# Patient Record
Sex: MALE | Race: WHITE | NOT HISPANIC OR LATINO | Employment: FULL TIME | URBAN - METROPOLITAN AREA
[De-identification: names, ages, dates, MRNs, and addresses within clinical notes are randomized per-mention and may not be internally consistent; named-entity substitution may affect disease eponyms.]

---

## 2020-10-02 ENCOUNTER — LAB (OUTPATIENT)
Dept: LAB | Facility: HOSPITAL | Age: 20
End: 2020-10-02

## 2020-10-02 ENCOUNTER — TRANSCRIBE ORDERS (OUTPATIENT)
Dept: LAB | Facility: HOSPITAL | Age: 20
End: 2020-10-02

## 2020-10-02 DIAGNOSIS — K60.3 ANAL FISTULA: Primary | ICD-10-CM

## 2020-10-02 PROCEDURE — 93005 ELECTROCARDIOGRAM TRACING: CPT | Performed by: COLON & RECTAL SURGERY

## 2020-10-02 PROCEDURE — 93010 ELECTROCARDIOGRAM REPORT: CPT | Performed by: INTERNAL MEDICINE

## 2021-02-02 ENCOUNTER — TELEPHONE (OUTPATIENT)
Dept: NEUROLOGY | Facility: CLINIC | Age: 21
End: 2021-02-02

## 2021-02-02 NOTE — TELEPHONE ENCOUNTER
Caller: PT    Relationship to patient: SELF    Best call back number: [phone]    Chief complaint: NA    Type of visit: NEW PATIENT    Requested date: NA     If rescheduling, when is the original appointment: PATIENT CANCELLED APPT     Additional notes: PATIENT CALLED AND STATED HE COULD ONLY DO AN APPT AFTER 3:30 PM DUE TO HIS WORK SCHEDULE. I DID NOT FIND ANYTHING IN THAT TIME FRAME GOING OUT INTO SUMMER FOR DR. CARDOSO. PLEASE ADVISE.

## 2021-05-14 ENCOUNTER — APPOINTMENT (OUTPATIENT)
Dept: GENERAL RADIOLOGY | Facility: HOSPITAL | Age: 21
End: 2021-05-14

## 2021-05-14 ENCOUNTER — HOSPITAL ENCOUNTER (EMERGENCY)
Facility: HOSPITAL | Age: 21
Discharge: HOME | End: 2021-05-14
Attending: EMERGENCY MEDICINE | Admitting: EMERGENCY MEDICINE

## 2021-05-14 VITALS
RESPIRATION RATE: 18 BRPM | BODY MASS INDEX: 26.41 KG/M2 | OXYGEN SATURATION: 100 % | TEMPERATURE: 97.8 F | WEIGHT: 195 LBS | HEIGHT: 72 IN | SYSTOLIC BLOOD PRESSURE: 142 MMHG | HEART RATE: 66 BPM | DIASTOLIC BLOOD PRESSURE: 87 MMHG
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
## 2021-05-14 DIAGNOSIS — S16.1XXA STRAIN OF NECK MUSCLE, INITIAL ENCOUNTER: Primary | ICD-10-CM

## 2021-05-14 DIAGNOSIS — S50.812A FOREARM ABRASION, LEFT, INITIAL ENCOUNTER: ICD-10-CM

## 2021-05-14 DIAGNOSIS — M54.9 MUSCULOSKELETAL BACK PAIN: ICD-10-CM

## 2021-05-14 DIAGNOSIS — S80.12XA CONTUSION OF LEFT LOWER LEG, INITIAL ENCOUNTER: ICD-10-CM

## 2021-05-14 PROCEDURE — 25010000002 TDAP 5-2.5-18.5 LF-MCG/0.5 SUSPENSION: Performed by: NURSE PRACTITIONER

## 2021-05-14 PROCEDURE — 90471 IMMUNIZATION ADMIN: CPT | Performed by: NURSE PRACTITIONER

## 2021-05-14 PROCEDURE — 99283 EMERGENCY DEPT VISIT LOW MDM: CPT

## 2021-05-14 PROCEDURE — 72072 X-RAY EXAM THORAC SPINE 3VWS: CPT

## 2021-05-14 PROCEDURE — 90715 TDAP VACCINE 7 YRS/> IM: CPT | Performed by: NURSE PRACTITIONER

## 2021-05-14 PROCEDURE — 72100 X-RAY EXAM L-S SPINE 2/3 VWS: CPT

## 2021-05-14 PROCEDURE — 73590 X-RAY EXAM OF LOWER LEG: CPT

## 2021-05-14 PROCEDURE — 72040 X-RAY EXAM NECK SPINE 2-3 VW: CPT

## 2021-05-14 RX ORDER — METHOCARBAMOL 750 MG/1
750 TABLET, FILM COATED ORAL 3 TIMES DAILY PRN
Qty: 20 TABLET | Refills: 0 | Status: SHIPPED | OUTPATIENT
Start: 2021-05-14 | End: 2021-07-13

## 2021-05-14 RX ORDER — LITHIUM CARBONATE 300 MG/1
300 CAPSULE ORAL
COMMUNITY
End: 2021-07-13

## 2021-05-14 RX ORDER — QUETIAPINE FUMARATE 100 MG/1
100 TABLET, FILM COATED ORAL NIGHTLY
COMMUNITY
End: 2021-07-13

## 2021-05-14 RX ORDER — IBUPROFEN 600 MG/1
600 TABLET ORAL ONCE
Status: COMPLETED | OUTPATIENT
Start: 2021-05-14 | End: 2021-05-14

## 2021-05-14 RX ORDER — METHYLPREDNISOLONE 4 MG/1
TABLET ORAL
Qty: 21 TABLET | Refills: 0 | Status: SHIPPED | OUTPATIENT
Start: 2021-05-14 | End: 2021-07-13

## 2021-05-14 RX ORDER — OXCARBAZEPINE 300 MG/1
TABLET ORAL
COMMUNITY
End: 2021-07-13

## 2021-05-14 RX ORDER — CYCLOBENZAPRINE HCL 10 MG
10 TABLET ORAL ONCE
Status: COMPLETED | OUTPATIENT
Start: 2021-05-14 | End: 2021-05-14

## 2021-05-14 RX ADMIN — IBUPROFEN 600 MG: 600 TABLET ORAL at 11:51

## 2021-05-14 RX ADMIN — CYCLOBENZAPRINE HYDROCHLORIDE 10 MG: 10 TABLET, FILM COATED ORAL at 11:51

## 2021-05-14 RX ADMIN — TETANUS TOXOID, REDUCED DIPHTHERIA TOXOID AND ACELLULAR PERTUSSIS VACCINE, ADSORBED 0.5 ML: 5; 2.5; 8; 8; 2.5 SUSPENSION INTRAMUSCULAR at 11:52

## 2021-06-30 ENCOUNTER — HOSPITAL ENCOUNTER (OUTPATIENT)
Dept: GENERAL RADIOLOGY | Facility: HOSPITAL | Age: 21
Discharge: HOME | End: 2021-06-30
Admitting: RADIOLOGY

## 2021-06-30 ENCOUNTER — TRANSCRIBE ORDERS (OUTPATIENT)
Dept: GENERAL RADIOLOGY | Facility: HOSPITAL | Age: 21
End: 2021-06-30

## 2021-06-30 DIAGNOSIS — H44.609: ICD-10-CM

## 2021-06-30 DIAGNOSIS — H44.609: Primary | ICD-10-CM

## 2021-06-30 PROCEDURE — 70150 X-RAY EXAM OF FACIAL BONES: CPT

## 2021-07-13 ENCOUNTER — OFFICE VISIT (OUTPATIENT)
Dept: NEUROLOGY | Facility: CLINIC | Age: 21
End: 2021-07-13

## 2021-07-13 VITALS — WEIGHT: 180 LBS | HEART RATE: 81 BPM | BODY MASS INDEX: 24.38 KG/M2 | OXYGEN SATURATION: 99 % | HEIGHT: 72 IN

## 2021-07-13 DIAGNOSIS — R56.9 SEIZURE (HCC): Primary | ICD-10-CM

## 2021-07-13 PROCEDURE — 99204 OFFICE O/P NEW MOD 45 MIN: CPT | Performed by: PSYCHIATRY & NEUROLOGY

## 2021-07-13 RX ORDER — MULTIPLE VITAMINS W/ MINERALS TAB 9MG-400MCG
1 TAB ORAL DAILY
COMMUNITY
End: 2021-10-12

## 2021-07-13 RX ORDER — TRAZODONE HYDROCHLORIDE 50 MG/1
50 TABLET ORAL NIGHTLY
Qty: 30 TABLET | Refills: 3 | OUTPATIENT
Start: 2021-07-13 | End: 2021-09-30

## 2021-07-13 NOTE — PROGRESS NOTES
Subjective:    CC: Marcell Hartman is seen today in consultation for Seizures.     HPI:  Patient is a 21-year-old male with ?  Diagnosis of complex partial seizures referred to the clinic to establish care.  He moved to [place] from [place] about a year ago.  He reports that he had first episode of questionable seizure when he was 16 years old.  At that time, he had abnormal sensory feeling starting from his left leg going up to the left arm and then to the face and then his body stiffened up.  The entire episode lasted for 1 to 2 minutes and then soon after the event was over, he was back to his baseline.  Following this, he was seen by neurology in [place], [place] and underwent MRI and EEG.  I reviewed the results of EEG that was performed back in 2019 and it did not reveal any abnormalities.  I do not have MRI brain results for me to review personally but reports that he was told that there were no abnormalities found.  There is no family history of seizures and he achieved all his milestones on time.  Following the first episode, he was started on Oxtellar  mg daily later, Lamictal was added for mood disorder.  He reports that he did stop Oxtellar XR in between for some time without any recurrent seizure.  He moved to [place] 1 year ago and was on Oxtellar for about 3 to 4 months which, he ran out of the medication.  He has now remained off of Oxtellar or lamotrigine for more than 6 to 7 months without any recurrent seizure.  He reports that he was on lithium started by psychiatrist for mood disorder but he has stopped taking it as well.  He reports that his mood is much better since coming off of Lamictal, lithium and Oxtellar XR and reports that the med issues that he was experiences was situational while he was in [place].  He does report poor rate poor quality sleep.      The following portions of the patient's history were reviewed today and updated as of 07/13/2021  : allergies, social  "history and problem list.  This document will be scanned to patient's chart.      Current Outpatient Medications:   •  multivitamin with minerals tablet tablet, Take 1 tablet by mouth Daily., Disp: , Rfl:   •  traZODone (DESYREL) 50 MG tablet, Take 1 tablet by mouth Every Night for 30 days., Disp: 30 tablet, Rfl: 3   Past Medical History:   Diagnosis Date   • Seizures (CMS/HCC)       Past Surgical History:   Procedure Laterality Date   • ANAL FISTULOTOMY        History reviewed. No pertinent family history.   Review of Systems   Constitutional: Negative.    HENT: Negative.    Eyes: Negative.    Respiratory: Negative.    Cardiovascular: Negative.    Gastrointestinal: Negative.    Endocrine: Negative.    Genitourinary: Negative.    Musculoskeletal: Negative.    Skin: Negative.    Allergic/Immunologic: Negative.    Neurological: Negative.    Hematological: Negative.    Psychiatric/Behavioral: Negative.        All other systems reviewed and are negative     Objective:    Pulse 81   Ht 182.9 cm (72\")   Wt 81.6 kg (180 lb)   SpO2 99%   BMI 24.41 kg/m²     Neurology Exam:    General apperance: NAD.     Mental status: Alert, awake and oriented to time place and person.    Recent and Remote memory: Can recall 3/3 objects at 5 minutes. Can recall historical events.     Attention span and Concentration: Serial 7s: Normal.     Fund of knowledge:  Normal.     Language and Speech: No aphasia or dysarthria.    Naming , Repitition and Comprehension:  Can name objects, repeat a sentence and follow commands. Speech is clear and fluent with good repetition, comprehension, and naming.    Cranial Nerves:   CN II: Visual fields are full. Intact. Fundi - Normal, No papillederma, Pupils - ADI  CN III, IV and VI: Extraocular movements are intact. Normal saccades.   CN V: Facial sensation is intact.   CN VII: Muscles of facial expression reveal no asymmetry. Intact.   CN VIII: Hearing is intact. Whispered voice intact.   CN IX and X: " Palate elevates symmetrically. Intact  CN XI: Shoulder shrug is intact.   CN XII: Tongue is midline without evidence of atrophy or fasciculation.     Motor:  Right UE muscle strength 5/5. Normal tone.     Left UE muscle strength 5/5. Normal tone.      Right LE muscle strength5/5. Normal tone.     Left LE muscle strength 5/5. Normal tone.      Sensory: Normal light touch, vibration and pinprick sensation bilaterally.    DTRs: 2+ bilaterally in upper and lower extremities.    Babinski: Negative bilaterally.    Co-ordination: Normal finger-to-nose, heel to shin B/L.    Rhomberg: Negative.    Gait: Normal.    Cardiovascular: Regular rate and rhythm without murmur, gallop or rub.    Ophthalmoscopic exam: Normal fundi, no papilledema.    Assessment and Plan:  1. Seizure (CMS/HCC)  ?  Complex partial seizures.  He had his first episode back in year 2016.  I reviewed the results of EEG that was last performed back in 2019 and it was normal.  MRI did not show any abnormality as per the patient.  He has now remained off of any antiseizure medication for about 8 months without any recurrent seizure.  I would like to continue to monitor him off of medication and see how he does.  Have advised him to call office right away in case he has any breakthrough seizures.  Since he is reporting difficulty with sleep, I am going to prescribe him trazodone 50 mg to be taken at bedtime.  Otherwise, I will plan to see him back in 3 months for follow-up.       Return in about 3 months (around 10/13/2021).     Adan Jacob MD

## 2021-07-30 ENCOUNTER — TELEPHONE (OUTPATIENT)
Dept: NEUROLOGY | Facility: CLINIC | Age: 21
End: 2021-07-30

## 2021-07-30 NOTE — TELEPHONE ENCOUNTER
Provider: JOSELINE  Caller: PATIENT  Relationship to Patient: SELF  Pharmacy: [facility]  Phone Number: [phone]    Reason for Call: PT WANTED TO INFORM DR TREVIZO OF A LIGHT AURA HE LAD LAST Friday- INCASE DR TREVIZO WANTED TO REDO TESTING OR PRESCRIBE ANY MEDS FOR EPILEPSY, WHICH DR TREVIZO IS ALREADY AWARE OF PTS DX.     When was the patient last seen: 7/13/21    When did it start: ONLY AURA ON Friday, FIRST ONE IN YEARS.     Characteristics of symptom/severity: PT STATES JUST WAS A LIGHT AURA, NO TENSING UP OR ANYTHING  Timing- Is it constant or intermittent: JUST A ONE TIME, SO FAR.     PT WAS WALKING DOWN STAIRS WHEN IT HAPPENED.

## 2021-09-30 PROCEDURE — U0004 COV-19 TEST NON-CDC HGH THRU: HCPCS | Performed by: FAMILY MEDICINE

## 2021-10-01 ENCOUNTER — TELEPHONE (OUTPATIENT)
Dept: URGENT CARE | Facility: CLINIC | Age: 21
End: 2021-10-01

## 2021-10-01 NOTE — TELEPHONE ENCOUNTER
----- Message from Maurizio Ness MD sent at 10/1/2021  2:44 PM EDT -----  Please inform patient of negative lab result    ----- Message -----  From: Lab, Background User  Sent: 10/1/2021   2:26 PM EDT  To:  [facility] [address] Covid Results

## 2021-10-12 ENCOUNTER — OFFICE VISIT (OUTPATIENT)
Dept: NEUROLOGY | Facility: CLINIC | Age: 21
End: 2021-10-12

## 2021-10-12 VITALS — HEIGHT: 72 IN | WEIGHT: 175 LBS | HEART RATE: 58 BPM | OXYGEN SATURATION: 97 % | BODY MASS INDEX: 23.7 KG/M2

## 2021-10-12 DIAGNOSIS — R56.9 SEIZURE (HCC): Primary | ICD-10-CM

## 2021-10-12 DIAGNOSIS — F31.81 BIPOLAR 2 DISORDER (HCC): ICD-10-CM

## 2021-10-12 PROCEDURE — 99214 OFFICE O/P EST MOD 30 MIN: CPT | Performed by: PSYCHIATRY & NEUROLOGY

## 2021-10-12 RX ORDER — QUETIAPINE FUMARATE 100 MG/1
100 TABLET, FILM COATED ORAL NIGHTLY
Qty: 30 TABLET | Refills: 3 | Status: SHIPPED | OUTPATIENT
Start: 2021-10-12 | End: 2021-11-12 | Stop reason: SDUPTHER

## 2021-10-12 RX ORDER — LITHIUM CARBONATE 300 MG/1
300 CAPSULE ORAL DAILY
Qty: 30 CAPSULE | Refills: 3 | Status: SHIPPED | OUTPATIENT
Start: 2021-10-12 | End: 2021-11-12 | Stop reason: SDUPTHER

## 2021-10-12 NOTE — PROGRESS NOTES
Subjective:    CC: Marcell Hartman is in clinic today for follow up for history of complex partial seizures.    HPI:  Initial visit: 7/13/2021: Patient is a 21-year-old male with ?  Diagnosis of complex partial seizures referred to the clinic to establish care.  He moved to [place] from [place] about a year ago.  He reports that he had first episode of questionable seizure when he was 16 years old.  At that time, he had abnormal sensory feeling starting from his left leg going up to the left arm and then to the face and then his body stiffened up.  The entire episode lasted for 1 to 2 minutes and then soon after the event was over, he was back to his baseline.  Following this, he was seen by neurology in [place], [place] and underwent MRI and EEG.  I reviewed the results of EEG that was performed back in 2019 and it did not reveal any abnormalities.  I do not have MRI brain results for me to review personally but reports that he was told that there were no abnormalities found.  There is no family history of seizures and he achieved all his milestones on time.  Following the first episode, he was started on Oxtellar  mg daily later, Lamictal was added for mood disorder.  He reports that he did stop Oxtellar XR in between for some time without any recurrent seizure.  He moved to [place] 1 year ago and was on Oxtellar for about 3 to 4 months which, he ran out of the medication.  He has now remained off of Oxtellar or lamotrigine for more than 6 to 7 months without any recurrent seizure.  He reports that he was on lithium started by psychiatrist for mood disorder but he has stopped taking it as well.  He reports that his mood is much better since coming off of Lamictal, lithium and Oxtellar XR and reports that the med issues that he was experiences was situational while he was in [place].  He does report poor rate poor quality sleep.    Follow-up: 10/12/2021: He is in clinic for regular follow-up.   "Since his last visit in July, he reports that he probably had 1 brief or where he felt somewhat confused lasting for few minutes and then he was back to his baseline.  He reports that he has not had any christine seizures.  He remains off of antiseizure medication.  He also is requesting if he can restart lithium 300 mg daily along with Seroquel 100 mg daily for his bipolar disorder.  He reports that he used to get prescription filled through her psychiatrist in [place] but now due to insurance change, he is unable to get refills through ED visits.  He is also requesting psychiatry referral here so he can be established for the better management of bipolar disorder.    The following portions of the patient's history were reviewed and updated as of 10/12/2021: allergies, social history and problem list.       Current Outpatient Medications:   •  lithium carbonate 300 MG capsule, Take 1 capsule by mouth Daily., Disp: 30 capsule, Rfl: 3  •  QUEtiapine (SEROquel) 100 MG tablet, Take 1 tablet by mouth Every Night., Disp: 30 tablet, Rfl: 3   Past Medical History:   Diagnosis Date   • Seizures (HCC)       Past Surgical History:   Procedure Laterality Date   • ANAL FISTULOTOMY        History reviewed. No pertinent family history.     Review of Systems   Constitutional: Negative.    HENT: Negative.    Eyes: Negative.    Respiratory: Negative.    Cardiovascular: Negative.    Gastrointestinal: Negative.    Endocrine: Negative.    Genitourinary: Negative.    Musculoskeletal: Negative.    Skin: Negative.    Allergic/Immunologic: Negative.    Neurological: Positive for seizures.   Hematological: Negative.    Psychiatric/Behavioral: Negative.      Objective:    Pulse 58   Ht 182.9 cm (72\")   Wt 79.4 kg (175 lb)   SpO2 97%   BMI 23.73 kg/m²     Neurology Exam:  General apperance: NAD.     Mental status: Alert, awake and oriented to time place and person.    Recent and Remote memory: Can recall 3/3 objects at 5 minutes. Can " recall historical events.     Attention span and Concentration: Serial 7s: Normal.     Fund of knowledge:  Normal.     Language and Speech: No aphasia or dysarthria.    Naming , Repitition and Comprehension:  Can name objects, repeat a sentence and follow commands. Speech is clear and fluent with good repetition, comprehension, and naming.    CN II to XII: Intact.    Opthalmoscopic Exam: No papilledema.    Motor:  Right UE muscle strength 5/5. Normal tone.     Left UE muscle strength 5/5. Normal tone.      Right LE muscle strength5/5. Normal tone.     Left LE muscle strength 5/5. Normal tone.      Sensory: Normal light touch, vibration and pinprick sensation bilaterally.    DTRs: 2+ bilaterally.    Babinski: Negative bilaterally.    Co-ordination: Normal finger-to-nose, heel to shin B/L.    Rhomberg: Negative.    Gait: Normal.    Cardiovascular: Regular rate and rhythm without murmur, gallop or rub.    Assessment and Plan:  1. Seizure (HCC)  2. Bipolar 2 disorder (HCC)  -No breakthrough seizures.  He remains off of antiseizure medication.  It has been almost over a year now without having any breakthrough seizures.  I will be prescribing him a lithium 300 mg Seroquel 100 mg to be taken once a day as he is currently completely out of medication and will be sending referral to psychiatry so that he can establish care for better management of bipolar disorder.  I will plan to see him back in clinic in 6 months for follow-up.  - Ambulatory Referral to Psychiatry       I spent 30 minutes face to face with the patient and spent more than 50% of this time  in management, instructions and education regarding above mentioned diagnosis and also on counseling and discussing about taking medication regularly, possible side effects with medication use, importance of good sleep hygiene, good hydration and regular exercise.    Return in about 6 months (around 4/12/2022).

## 2021-11-12 ENCOUNTER — TELEPHONE (OUTPATIENT)
Dept: NEUROLOGY | Facility: CLINIC | Age: 21
End: 2021-11-12

## 2021-11-12 RX ORDER — QUETIAPINE FUMARATE 100 MG/1
100 TABLET, FILM COATED ORAL NIGHTLY
Qty: 30 TABLET | Refills: 3 | Status: SHIPPED | OUTPATIENT
Start: 2021-11-12 | End: 2022-01-11

## 2021-11-12 RX ORDER — LITHIUM CARBONATE 300 MG/1
300 CAPSULE ORAL DAILY
Qty: 30 CAPSULE | Refills: 3 | Status: SHIPPED | OUTPATIENT
Start: 2021-11-12 | End: 2022-01-11

## 2021-11-12 NOTE — TELEPHONE ENCOUNTER
Caller: BEVERLY    Relationship: SELF    Best call back number: [phone]    What medications are you currently taking:   Current Outpatient Medications on File Prior to Visit   Medication Sig Dispense Refill   • lithium carbonate 300 MG capsule Take 1 capsule by mouth Daily. 30 capsule 3   • QUEtiapine (SEROquel) 100 MG tablet Take 1 tablet by mouth Every Night. 30 tablet 3   • [DISCONTINUED] traZODone (DESYREL) 50 MG tablet Take 1 tablet by mouth Every Night for 30 days. 30 tablet 3     No current facility-administered medications on file prior to visit.        Which medication are you concerned about: PER PT'S INSURANCE THIS MEDICATION NEEDS TO BE SENT TO [facility] TO BE COVERED NOT [facility]. PT NEEDS QUEtiapine (SEROquel) 100 MG tablet AND lithium carbonate 300 MG capsule    Pharmacy where request should be sent:  [id number]    Additional details provided by patient: PT CURRENTLY HAS REFILLS AT [facility] BUT INSURANCE WILL NOT COVER IF FILLED BY [facility],    HE CURRENTLY HAS 2 DAYS WORTH OF MEDICATION LEFT.     Does the patient have less than a 3 day supply:  [x] Yes  [] No    Christian Ghosh Rep   11/12/21 13:21 EST

## 2022-01-11 RX ORDER — QUETIAPINE FUMARATE 100 MG/1
TABLET, FILM COATED ORAL
Qty: 30 TABLET | Refills: 3 | Status: SHIPPED | OUTPATIENT
Start: 2022-01-11 | End: 2022-04-13

## 2022-01-11 RX ORDER — LITHIUM CARBONATE 300 MG/1
CAPSULE ORAL
Qty: 30 CAPSULE | Refills: 3 | OUTPATIENT
Start: 2022-01-11 | End: 2022-11-30

## 2022-04-13 RX ORDER — QUETIAPINE FUMARATE 100 MG/1
TABLET, FILM COATED ORAL
Qty: 90 TABLET | Refills: 1 | Status: SHIPPED | OUTPATIENT
Start: 2022-04-13 | End: 2022-12-09

## 2022-04-13 NOTE — TELEPHONE ENCOUNTER
Rx Refill Note  Requested Prescriptions     Pending Prescriptions Disp Refills   • QUEtiapine (SEROquel) 100 MG tablet [Pharmacy Med Name: QUETIAPINE FUMARATE 100 MG TAB] 90 tablet 1     Sig: TAKE 1 TABLET BY MOUTH EVERY DAY AT NIGHT      Last office visit with prescribing clinician: 10/12/2021      Next office visit with prescribing clinician: Visit date not found            Kedar Ceballos MA  04/13/22, 14:59 EDT  
none

## 2022-10-08 PROCEDURE — U0004 COV-19 TEST NON-CDC HGH THRU: HCPCS | Performed by: FAMILY MEDICINE

## 2022-10-10 ENCOUNTER — TELEPHONE (OUTPATIENT)
Dept: URGENT CARE | Facility: CLINIC | Age: 22
End: 2022-10-10

## 2022-10-10 NOTE — TELEPHONE ENCOUNTER
----- Message from Maurizio Ness MD sent at 10/10/2022  8:41 AM EDT -----  Please inform the patient's of negative COVID test    ----- Message -----  From: Lab, Background User  Sent: 10/9/2022   5:02 PM EDT  To:  [address] Covid Results    Patient called for covid result. Patient advised of result. Patient advised of understanding.

## 2022-12-09 RX ORDER — QUETIAPINE FUMARATE 100 MG/1
TABLET, FILM COATED ORAL
Qty: 90 TABLET | Refills: 1 | Status: SHIPPED | OUTPATIENT
Start: 2022-12-09

## 2022-12-09 NOTE — TELEPHONE ENCOUNTER
Rx Refill Note  Requested Prescriptions     Pending Prescriptions Disp Refills   • QUEtiapine (SEROquel) 100 MG tablet [Pharmacy Med Name: QUETIAPINE FUMARATE 100 MG TAB] 90 tablet 1     Sig: TAKE 1 TABLET BY MOUTH EVERY DAY AT NIGHT      Last filled:04/13/22 with 1 refill. Sent  Last office visit with prescribing clinician: 10/12/2021      Next office visit with prescribing clinician: Visit date not found     Reji Souza MA  12/09/22, 10:26 EST

## 2023-10-12 NOTE — TELEPHONE ENCOUNTER
"Relay     \"Outgoing call to Marcell, JERSONM    Needs to schedule follow up for refills. Also calling to see if he is still taking Seroquel, we haven't sent in refills in some time.     Asked he call back to let us know and to schedule appt.     Kristian DOVE \"                "

## 2023-10-13 RX ORDER — QUETIAPINE FUMARATE 100 MG/1
TABLET, FILM COATED ORAL
Qty: 90 TABLET | Refills: 1 | Status: SHIPPED | OUTPATIENT
Start: 2023-10-13

## 2023-10-13 NOTE — TELEPHONE ENCOUNTER
Name: Marcell Hartman  Relationship: Self  Best Callback Number: [phone]    HUB PROVIDED THE RELAY MESSAGE FROM THE OFFICE   PATIENT SCHEDULED AS REQUESTED  ADDITIONAL INFORMATION: PATIENT IS STILL TAKING SEROQUEL AND HAS ABOUT A WEEK'S WORTH LEFT. WE GOT HIM SCHEDULED FOR 1ST AVAILABLE.

## 2023-10-13 NOTE — TELEPHONE ENCOUNTER
Rx Refill Note  Requested Prescriptions     Pending Prescriptions Disp Refills    QUEtiapine (SEROquel) 100 MG tablet [Pharmacy Med Name: QUETIAPINE FUMARATE 100 MG TAB] 90 tablet 1     Sig: TAKE 1 TABLET BY MOUTH EVERY DAY AT NIGHT      Last filled: 12/9/22 90 days with 1 refill. Confirmed pt is still taking and has about a week of meds left. Scheduled next available appt. Sending enough meds to get him to then    Last office visit with prescribing clinician: 10/12/2021      Next office visit with prescribing clinician: 3/18/2024     Kristian Guzman MA  10/13/23, 13:20 EDT

## 2023-12-27 ENCOUNTER — OFFICE VISIT (OUTPATIENT)
Dept: FAMILY MEDICINE CLINIC | Facility: CLINIC | Age: 23
End: 2023-12-27
Payer: COMMERCIAL

## 2023-12-27 ENCOUNTER — LAB (OUTPATIENT)
Dept: LAB | Facility: HOSPITAL | Age: 23
End: 2023-12-27
Payer: COMMERCIAL

## 2023-12-27 VITALS
DIASTOLIC BLOOD PRESSURE: 80 MMHG | WEIGHT: 190 LBS | HEART RATE: 65 BPM | BODY MASS INDEX: 25.73 KG/M2 | OXYGEN SATURATION: 99 % | SYSTOLIC BLOOD PRESSURE: 118 MMHG | HEIGHT: 72 IN

## 2023-12-27 DIAGNOSIS — R74.01 TRANSAMINITIS: ICD-10-CM

## 2023-12-27 DIAGNOSIS — Z00.00 ANNUAL PHYSICAL EXAM: Primary | ICD-10-CM

## 2023-12-27 DIAGNOSIS — Z79.890 LONG-TERM CURRENT USE OF TESTOSTERONE REPLACEMENT THERAPY: ICD-10-CM

## 2023-12-27 DIAGNOSIS — Z11.3 ROUTINE SCREENING FOR STI (SEXUALLY TRANSMITTED INFECTION): ICD-10-CM

## 2023-12-27 LAB
ALBUMIN SERPL-MCNC: 4.2 G/DL (ref 3.5–5.2)
ALBUMIN/GLOB SERPL: 1.4 G/DL
ALP SERPL-CCNC: 54 U/L (ref 39–117)
ALT SERPL W P-5'-P-CCNC: 58 U/L (ref 1–41)
ANION GAP SERPL CALCULATED.3IONS-SCNC: 9.4 MMOL/L (ref 5–15)
AST SERPL-CCNC: 46 U/L (ref 1–40)
BILIRUB SERPL-MCNC: 0.3 MG/DL (ref 0–1.2)
BUN SERPL-MCNC: 18 MG/DL (ref 6–20)
BUN/CREAT SERPL: 16.8 (ref 7–25)
CALCIUM SPEC-SCNC: 9.7 MG/DL (ref 8.6–10.5)
CHLORIDE SERPL-SCNC: 104 MMOL/L (ref 98–107)
CO2 SERPL-SCNC: 25.6 MMOL/L (ref 22–29)
CREAT SERPL-MCNC: 1.07 MG/DL (ref 0.76–1.27)
EGFRCR SERPLBLD CKD-EPI 2021: 98.8 ML/MIN/1.73
GLOBULIN UR ELPH-MCNC: 2.9 GM/DL
GLUCOSE SERPL-MCNC: 79 MG/DL (ref 65–99)
POTASSIUM SERPL-SCNC: 4.1 MMOL/L (ref 3.5–5.2)
PROT SERPL-MCNC: 7.1 G/DL (ref 6–8.5)
SODIUM SERPL-SCNC: 139 MMOL/L (ref 136–145)
TESTOST SERPL-MCNC: 395 NG/DL (ref 249–836)

## 2023-12-27 PROCEDURE — 87591 N.GONORRHOEAE DNA AMP PROB: CPT | Performed by: STUDENT IN AN ORGANIZED HEALTH CARE EDUCATION/TRAINING PROGRAM

## 2023-12-27 PROCEDURE — 84403 ASSAY OF TOTAL TESTOSTERONE: CPT | Performed by: STUDENT IN AN ORGANIZED HEALTH CARE EDUCATION/TRAINING PROGRAM

## 2023-12-27 PROCEDURE — 80053 COMPREHEN METABOLIC PANEL: CPT | Performed by: STUDENT IN AN ORGANIZED HEALTH CARE EDUCATION/TRAINING PROGRAM

## 2023-12-27 PROCEDURE — 87661 TRICHOMONAS VAGINALIS AMPLIF: CPT | Performed by: STUDENT IN AN ORGANIZED HEALTH CARE EDUCATION/TRAINING PROGRAM

## 2023-12-27 PROCEDURE — 87491 CHLMYD TRACH DNA AMP PROBE: CPT | Performed by: STUDENT IN AN ORGANIZED HEALTH CARE EDUCATION/TRAINING PROGRAM

## 2023-12-27 NOTE — ASSESSMENT & PLAN NOTE
Non- fasting labs today w STI screening  Declines flu vaccination  Up-to-date on tetanus 3/2021  Counseling provided based on age appropriate USPSTF guidelines.

## 2023-12-27 NOTE — PROGRESS NOTES
"    New Patient Office Visit      Date: 2023   Patient Name: Marcell Hartman  : 2000   MRN: [id number]     Chief Complaint:    Chief Complaint   Patient presents with    Annual Exam       History of Present Illness: Marcell Hartman is a 23 y.o. male who is here today to establish care.      Subjective      HPI:  Patient presents to establish care.  No acute concerns   Started using SARMS for workout.  Read that this can have negative side effects on testosterone level so would like this checked.  Used for 4 weeks and is now on his \"off cycle.\"    Was diagnosed with epilepsy coming out of high school, states that he eventually \"grew out of it.\" Hasn't taken AED medication for this in 2-3 years, no seizures. Saw Neurology and had EEG performed during initial diagnosis.     Takes Seroquel for mood stabilizer. Has been on this medication for 3-4 years.  Reports this works well to control mood, depression. Has appt to reestablish with behavioral health provider in April.  Has another refill on file, none needed today.    Review of Systems:   Negative/not pertinent unless otherwise noted above in HPI.     Past Medical History:   Past Medical History:   Diagnosis Date    Bipolar disorder     Bipolar disorder     Seizures        Past Surgical History:   Past Surgical History:   Procedure Laterality Date    ANAL FISTULOTOMY          NASAL SEPTUM SURGERY         Family History:   Family History   Family history unknown: Yes       Social History:   Social History     Socioeconomic History    Marital status: Single   Tobacco Use    Smoking status: Never    Smokeless tobacco: Never   Vaping Use    Vaping Use: Every day    Substances: Nicotine   Substance and Sexual Activity    Alcohol use: Never    Drug use: Never    Sexual activity: Defer       Medications:     Current Outpatient Medications:     QUEtiapine (SEROquel) 100 MG tablet, TAKE 1 TABLET BY MOUTH EVERY DAY AT NIGHT, Disp: 90 tablet, Rfl: 1    Allergies:   No " "Known Allergies    Immunizations:  Immunization History   Administered Date(s) Administered    Tdap 05/14/2021     Hep C (Age 18-79 once):  previously negative  HIV (Age 15-65 once): previously negative  A1c: ordered  Lipid panel: ordered      Tobacco Use: Low Risk  (12/27/2023)    Patient History     Smoking Tobacco Use: Never     Smokeless Tobacco Use: Never     Passive Exposure: Not on file       Social History     Substance and Sexual Activity   Alcohol Use Never        Social History     Substance and Sexual Activity   Drug Use Never        Diet/Physical activity: Healthy, regular physical activity. counseled on 12/27/23      Sexual Health: using contraception, not attempting pregnancy   Menopause: pre-menopausal  Menstrual Cycles: regular, last menstrual cycle: unknown    PHQ-2 Depression Screening  Little interest or pleasure in doing things? 1-->several days   Feeling down, depressed, or hopeless? 0-->not at all   PHQ-2 Total Score 1     PHQ-9 Total Score: 1       Objective     Physical Exam:  Vital Signs:   Vitals:    12/27/23 1018   BP: 118/80   BP Location: Left arm   Patient Position: Sitting   Cuff Size: Adult   Pulse: 65   SpO2: 99%   Weight: 86.2 kg (190 lb)   Height: 182.9 cm (72.01\")     Body mass index is 25.76 kg/m².    Physical Exam  Vitals reviewed.   Constitutional:       General: He is not in acute distress.     Appearance: Normal appearance. He is normal weight.   HENT:      Head: Normocephalic and atraumatic.      Right Ear: Tympanic membrane normal.      Left Ear: Tympanic membrane normal.      Nose: Nose normal.      Mouth/Throat:      Mouth: Mucous membranes are moist.      Pharynx: Oropharynx is clear. No oropharyngeal exudate or posterior oropharyngeal erythema.   Eyes:      Extraocular Movements: Extraocular movements intact.      Conjunctiva/sclera: Conjunctivae normal.      Pupils: Pupils are equal, round, and reactive to light.   Cardiovascular:      Rate and Rhythm: Normal rate and " regular rhythm.      Heart sounds: Normal heart sounds. No murmur heard.  Pulmonary:      Effort: Pulmonary effort is normal.      Breath sounds: No wheezing.   Abdominal:      General: Abdomen is flat. Bowel sounds are normal.      Palpations: Abdomen is soft. There is no mass.      Tenderness: There is no abdominal tenderness.   Musculoskeletal:         General: Normal range of motion.      Cervical back: Normal range of motion and neck supple.   Lymphadenopathy:      Cervical: No cervical adenopathy.   Neurological:      General: No focal deficit present.      Mental Status: He is alert.   Psychiatric:         Mood and Affect: Mood normal.         Thought Content: Thought content normal.           Assessment / Plan      Assessment/Plan:   Diagnoses and all orders for this visit:    1. Annual physical exam (Primary)  -     Comprehensive Metabolic Panel; Future  -     Testosterone; Future  -     Comprehensive Metabolic Panel  -     Testosterone    2. Routine screening for STI (sexually transmitted infection)  -     Chlamydia trachomatis, Neisseria gonorrhoeae, Trichomonas vaginalis, PCR - Urine, Urine, Clean Catch; Future  -     Chlamydia trachomatis, Neisseria gonorrhoeae, Trichomonas vaginalis, PCR - Urine, Urine, Clean Catch    3. Long-term current use of testosterone replacement therapy  -     Testosterone; Future  -     Testosterone        Healthcare Maintenance:  Non- fasting labs today w STI screening  Declines flu vaccination  Up-to-date on tetanus 3/2021  Counseling provided based on age appropriate USPSTF guidelines.  BMI is >= 25 and <30. (Overweight) The following options were offered after discussion;: exercise counseling/recommendations, nutrition counseling/recommendations, and information on healthy weight added to patient's after visit summary     Reviewed risks of SARM therapy including mood/behavioral changes, chronic testosterone lowering that can symptoms be irreversible.  Will check  testosterone level today.    Marcell Hartman voices understanding and acceptance of this advice and will call back with any further questions or concerns. AVS with preventive healthcare tips printed for patient.         Follow Up:   Return in about 1 year (around 12/27/2024) for Annual.        Luba Nation DO  [facility] [address]

## 2023-12-28 ENCOUNTER — PATIENT ROUNDING (BHMG ONLY) (OUTPATIENT)
Dept: FAMILY MEDICINE CLINIC | Facility: CLINIC | Age: 23
End: 2023-12-28
Payer: COMMERCIAL

## 2023-12-29 LAB
C TRACH RRNA SPEC QL NAA+PROBE: NEGATIVE
N GONORRHOEA RRNA SPEC QL NAA+PROBE: NEGATIVE
T VAGINALIS RRNA SPEC QL NAA+PROBE: NEGATIVE

## 2024-03-19 ENCOUNTER — OFFICE VISIT (OUTPATIENT)
Dept: FAMILY MEDICINE CLINIC | Facility: CLINIC | Age: 24
End: 2024-03-19
Payer: COMMERCIAL

## 2024-03-19 VITALS
DIASTOLIC BLOOD PRESSURE: 74 MMHG | SYSTOLIC BLOOD PRESSURE: 116 MMHG | WEIGHT: 191.2 LBS | OXYGEN SATURATION: 98 % | BODY MASS INDEX: 25.9 KG/M2 | HEART RATE: 72 BPM | HEIGHT: 72 IN

## 2024-03-19 DIAGNOSIS — R09.82 POST-NASAL DRIP: Primary | ICD-10-CM

## 2024-03-19 PROCEDURE — 99213 OFFICE O/P EST LOW 20 MIN: CPT | Performed by: STUDENT IN AN ORGANIZED HEALTH CARE EDUCATION/TRAINING PROGRAM

## 2024-03-19 RX ORDER — CETIRIZINE HYDROCHLORIDE 10 MG/1
10 TABLET ORAL DAILY
Qty: 30 TABLET | Refills: 5 | Status: SHIPPED | OUTPATIENT
Start: 2024-03-19

## 2024-03-19 RX ORDER — FLUTICASONE PROPIONATE 50 MCG
2 SPRAY, SUSPENSION (ML) NASAL DAILY
Qty: 16 ML | Refills: 2 | Status: SHIPPED | OUTPATIENT
Start: 2024-03-19

## 2024-03-19 NOTE — PROGRESS NOTES
Chief Complaint   Patient presents with    Sinus Problem     Pt. States he would like to be referred to ENT.        HPI:  Marcell Hartman is a 23 y.o. male who presents today for nasal drainage.    Pt struggling w chronic rhinorrhea and post nasal drip. Has h/o deviated septum requiring surgical repair in 2014. Post operative course was c/b severe sinus infection ultimately attributed to retained nasal packing. Sypmtoms are worse when laying down at night and when first waking up in the morning. Worse with season changes and since having a cat at home. Using astepro spray currently w/o significant relief. Has tried flonase in the past.  PE:  Vitals:    03/19/24 1108   BP: 116/74   Pulse: 72   SpO2: 98%      Body mass index is 25.93 kg/m².    Gen Appearance: NAD  HEENT: Normocephalic, EOMI, no sinus TTP, no nasal passage obstruction or polyp visualized, post nasal drip noted  Lungs: Normal WOB  MSK: Moves all extremities well, normal gait, no peripheral edema  Neuro: No focal deficits    Current Outpatient Medications   Medication Sig Dispense Refill    cetirizine (zyrTEC) 10 MG tablet Take 1 tablet by mouth Daily. 30 tablet 5    fluticasone (FLONASE) 50 MCG/ACT nasal spray 2 sprays into the nostril(s) as directed by provider Daily. 16 mL 2    QUEtiapine (SEROquel) 100 MG tablet TAKE 1 TABLET BY MOUTH EVERY DAY AT NIGHT 90 tablet 1     No current facility-administered medications for this visit.        A/P:  Diagnoses and all orders for this visit:    1. Post-nasal drip (Primary)  -     cetirizine (zyrTEC) 10 MG tablet; Take 1 tablet by mouth Daily.  Dispense: 30 tablet; Refill: 5  -     fluticasone (FLONASE) 50 MCG/ACT nasal spray; 2 sprays into the nostril(s) as directed by provider Daily.  Dispense: 16 mL; Refill: 2  -     Ambulatory Referral to ENT (Otolaryngology)       Add Flonase which should work synergistcally with nasal anti histamine spray  Add oral anti histamine allergy medication  Referral to ENT placed per  patient request        Dictated Utilizing Dragon Dictation    Please note that portions of this note were completed with a voice recognition program.    Part of this note may be an electronic transcription/translation of spoken language to printed text using the Dragon Dictation System.

## 2024-04-15 RX ORDER — QUETIAPINE FUMARATE 100 MG/1
100 TABLET, FILM COATED ORAL
Qty: 30 TABLET | Refills: 0 | Status: SHIPPED | OUTPATIENT
Start: 2024-04-15

## 2024-04-15 NOTE — TELEPHONE ENCOUNTER
Rx Refill Note  Requested Prescriptions     Pending Prescriptions Disp Refills    QUEtiapine (SEROquel) 100 MG tablet [Pharmacy Med Name: QUETIAPINE FUMARATE 100 MG TAB] 90 tablet 1     Sig: TAKE 1 TABLET BY MOUTH EVERY DAY AT NIGHT      Last filled: 10/13/23 for 6 mos     Last office visit with prescribing clinician: 10/12/21    Next office visit with prescribing clinician: Visit date not found, needs appt, has cancelled two     Kristian Guzman MA  04/15/24, 08:47 EDT

## 2024-05-17 RX ORDER — QUETIAPINE FUMARATE 100 MG/1
100 TABLET, FILM COATED ORAL
Qty: 30 TABLET | Refills: 0 | OUTPATIENT
Start: 2024-05-17

## 2024-05-23 RX ORDER — QUETIAPINE FUMARATE 100 MG/1
100 TABLET, FILM COATED ORAL
Qty: 90 TABLET | Refills: 0 | Status: SHIPPED | OUTPATIENT
Start: 2024-05-23

## 2024-05-23 NOTE — TELEPHONE ENCOUNTER
Caller: Marcell Hartman    Relationship: Self    Best call back number: [phone]    Requested Prescriptions:   Requested Prescriptions     Pending Prescriptions Disp Refills    QUEtiapine (SEROquel) 100 MG tablet 30 tablet 0     Sig: Take 1 tablet by mouth every night at bedtime. **must schedule AND KEEP appt for additional refills, thank you**        Pharmacy where request should be sent: [id number] - [place], [place] - [address] - [phone]  - [phone] FX     Last office visit with prescribing clinician: 10/12/21  Last telemedicine visit with prescribing clinician: Visit date not found   Next office visit with prescribing clinician: Visit date not found LORRAINE JORDAN - 10/10/24    Additional details provided by patient: OUT OF MEDICATION    Does the patient have less than a 3 day supply:  [x] Yes  [] No    Would you like a call back once the refill request has been completed: [] Yes [x] No    If the office needs to give you a call back, can they leave a voicemail: [] Yes [x] No    Christian Burton Rep   05/23/24 10:07 EDT

## 2024-08-23 RX ORDER — QUETIAPINE FUMARATE 100 MG/1
100 TABLET, FILM COATED ORAL
Qty: 30 TABLET | Refills: 1 | Status: SHIPPED | OUTPATIENT
Start: 2024-08-23

## 2024-08-23 NOTE — TELEPHONE ENCOUNTER
Rx Refill Note  Requested Prescriptions     Pending Prescriptions Disp Refills    QUEtiapine (SEROquel) 100 MG tablet [Pharmacy Med Name: QUETIAPINE FUMARATE 100 MG TAB] 90 tablet 0     Sig: TAKE 1 TABLET BY MOUTH EVERY NIGHT AT BEDTIME. **MUST KEEP OCTOBER APPT. LAST SEEN IN 2021**      Last filled: 5/23/24 90 days 0 refills  Last office visit with prescribing clinician: 10/12/2021     Next office visit with prescribing clinician: 10/10/2024     Kristian Guzman MA  08/23/24, 08:12 EDT